# Patient Record
(demographics unavailable — no encounter records)

---

## 2025-04-01 NOTE — PHYSICAL EXAM
[3-stage Verbal Command ___ / 3] : three-stage verbal command [unfilled] / 3 [General Appearance - Alert] : alert [Person] : oriented to person [Place] : oriented to place [Time] : oriented to time [Naming Objects] : no difficulty naming common objects [Repeating Phrases] : no difficulty repeating a phrase [Writing A Sentence] : no difficulty writing a sentence [Past History] : adequate knowledge of personal past history [Total Score ___ / 30] : the patient achieved a score of [unfilled] /30 [Date / Time ___ / 5] : date / time [unfilled] / 5 [Place ___ / 5] : place [unfilled] / 5 [Registration ___ / 3] : registration [unfilled] / 3 [Serial Sevens ___/5] : serial sevens [unfilled] / 5 [Naming 2 Objects ___ / 2] : naming two objects [unfilled] / 2 [Repeating a Sentence ___ / 1] : repeating a sentence [unfilled] / 1 [Writing a Sentence ___ / 1] : write sentence [unfilled] / 1 [Written Command ___ / 1] : written command [unfilled] / 1 [Copy a Design ___ / 1] : copy a design [unfilled] / 1 [Recall ___ / 3] : recall [unfilled] / 3 [Cranial Nerves Optic (II)] : visual acuity intact bilaterally,  visual fields full to confrontation, pupils equal round and reactive to light [Cranial Nerves Oculomotor (III)] : extraocular motion intact [Cranial Nerves Trigeminal (V)] : facial sensation intact symmetrically [Cranial Nerves Facial (VII)] : face symmetrical [Cranial Nerves Vestibulocochlear (VIII)] : hearing was intact bilaterally [Cranial Nerves Glossopharyngeal (IX)] : tongue and palate midline [Cranial Nerves Accessory (XI - Cranial And Spinal)] : head turning and shoulder shrug symmetric [Cranial Nerves Hypoglossal (XII)] : there was no tongue deviation with protrusion [General Appearance - Well Nourished] : well nourished [Remote Intact] : remote memory intact [Registration Intact] : recent registration memory intact [Span Intact] : the attention span was normal [Concentration Intact] : normal concentrating ability [Visual Intact] : visual attention was ~T not ~L decreased [Fluency] : fluency intact [Comprehension] : comprehension intact [Reading] : reading intact [Vocabulary] : adequate range of vocabulary [Motor Tone] : muscle tone was normal in all four extremities [Motor Strength] : muscle strength was normal in all four extremities [Motor Strength Upper Extremities Left] : there was weakness of the left upper extremity [Limited Balance] : the patient's balance was impaired [2+] : Ankle jerk left 2+ [Short Term Intact] : short term memory impaired [Current Events] : inadequate knowledge of current events [Motor Strength Lower Extremities Bilaterally] : strength was normal in both lower extremities [Romberg's Sign] : Romberg's sign was negtive [Tremor] : no tremor present [FreeTextEntry4] : Mental Status Exam   Presidents: 1/5 Alternating Pattern: ok  Spiral: ok Clock: difficulty  Repetition: ok Trail A: B:  Fluency: A: 7 Animals: 3   Go-No-Go: hesitancy    R/L discrimination on self and examiner: ok  Cross-line commands: ok Praxis: ok - Motor: ok -Dynamic/Luria: difficulty switching hands   -Ideomotor/Imitation: ok  -Ideational/writing/closing-in: ok  -Dressing: ok [FreeTextEntry6] : has bursitis and arthritis  [FreeTextEntry8] : slow cautioned gait

## 2025-04-01 NOTE — HISTORY OF PRESENT ILLNESS
[FreeTextEntry1] : NO COVID.   COVID VACCINE FULL.  HPI: 91-year-old female presents today for initial cognitive evaluation with her son. She lives with a  . She is more forgetful and confused x a few years but has gotten worse. She is repeating herself and losing track of conversations and getting frustration. She is very agitated. She feels frustrated. Denies depression or anxiety. At nighttime she thinks people are in house and forgets who her  is. Hallucinations are happening more frewuently. No known family history but son thinks sister is going through same thing. No recent falls or hospitalization. She has gotten lost driving but refuses to stop driving,    PMH: HTN  -Memory: stm loss   -Speech: difficulty with conversations  -Orientation:  getting lost driving  -Praxis: ok  -Decision making/Executive fx/Multitasking:    -Sleep: ok   -Appetite: ok. Lost weight over many years.     -Motor symptoms: poor balance. uses cane occasionally      -B/B: none   -Psychiatric symptoms: none    -Functional status:   [place] Index of [place] in Activities of Daily Livin. Bathing/Showerin  2. Dressin  3. Toiletin   4. Transferrin   5. Continence: 1  6: Feedin    TOTAL:  6     [place]-Shar Instrumental Activities of Daily Living:  A. Ability to Use Telephone: 1   B. Shoppin  C. Food Preparation: 0    D. Housekeepin   E. Laundry: 1 F. Transportation: 1    G. 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 Che   Workup done: none

## 2025-04-01 NOTE — ASSESSMENT
[FreeTextEntry1] : Assessment: 91 year old female with pmh htn. Indepenent in adls. Needs assistance with most iadls. MMSE 20/30 with poor recall of words. Difficulty with clock. Able to write very short sentence.   Diagnostic Impression:     -memory loss -cognitive decline -hallucinations   Plan: -Rule out reversible and vascular causes -MRI -B vitamins, RPR, TFT -Lyme panel -Basic metabolic labs -Did not want to do npt at this time -[facility] to [facility] -driving evaluation -Educated on importance of healthy lifestyle modifications such as daily exercise, healthy med. diet and good sleep habits -I do not reccomend achei at this time due to 93 lbs and loss of weight.

## 2025-04-01 NOTE — REVIEW OF SYSTEMS
[Confused or Disoriented] : confusion [Memory Lapses or Loss] : memory loss [Repeating Questions] : repeated questioning about recent events [Arm Weakness] : arm weakness [Leg Weakness] : leg weakness [Poor Coordination] : poor coordination [Loss Of Hearing] : hearing loss [Joint Pain] : joint pain [Negative] : Heme/Lymph [Decr. Concentrating Ability] : no decrease in concentrating ability [Difficulty with Language] : no ~M difficulty with language [Changed Thought Patterns] : no change in thought patterns [Facial Weakness] : no facial weakness [Hand Weakness] : no hand weakness [Difficulty Writing] : no difficulty writing [Difficulties in Speech] : no speech difficulties [Numbness] : no numbness [Tingling] : no tingling [Abnormal Sensation] : no abnormal sensation [Hypersensitivity] : no hypersensitivity [Seizures] : no convulsions [Dizziness] : no dizziness [Fainting] : no fainting [Lightheadedness] : no lightheadedness [Vertigo] : no vertigo [Cluster Headache] : no cluster headache [Migraine Headache] : no migraine headache [Tension Headache] : no tension-type headache [Difficulty Walking] : no difficulty walking [Inability to Walk] : able to walk [Ataxia] : no ataxia [Frequent Falls] : not falling [Limping] : not limping
